# Patient Record
Sex: FEMALE | Race: WHITE | Employment: OTHER | URBAN - METROPOLITAN AREA
[De-identification: names, ages, dates, MRNs, and addresses within clinical notes are randomized per-mention and may not be internally consistent; named-entity substitution may affect disease eponyms.]

---

## 2017-06-29 ENCOUNTER — TRANSFERRED RECORDS (OUTPATIENT)
Dept: HEALTH INFORMATION MANAGEMENT | Facility: CLINIC | Age: 77
End: 2017-06-29

## 2017-10-27 ENCOUNTER — TRANSFERRED RECORDS (OUTPATIENT)
Dept: HEALTH INFORMATION MANAGEMENT | Facility: CLINIC | Age: 77
End: 2017-10-27

## 2021-02-05 ENCOUNTER — TRANSFERRED RECORDS (OUTPATIENT)
Dept: HEALTH INFORMATION MANAGEMENT | Facility: CLINIC | Age: 81
End: 2021-02-05

## 2021-05-26 ENCOUNTER — TRANSFERRED RECORDS (OUTPATIENT)
Dept: HEALTH INFORMATION MANAGEMENT | Facility: CLINIC | Age: 81
End: 2021-05-26

## 2021-05-26 ENCOUNTER — RECORDS - HEALTHEAST (OUTPATIENT)
Dept: LAB | Facility: CLINIC | Age: 81
End: 2021-05-26

## 2021-05-26 ENCOUNTER — MEDICAL CORRESPONDENCE (OUTPATIENT)
Dept: HEALTH INFORMATION MANAGEMENT | Facility: CLINIC | Age: 81
End: 2021-05-26

## 2021-05-27 ENCOUNTER — MEDICAL CORRESPONDENCE (OUTPATIENT)
Dept: HEALTH INFORMATION MANAGEMENT | Facility: CLINIC | Age: 81
End: 2021-05-27

## 2021-05-27 LAB
ALBUMIN SERPL-MCNC: 3 G/DL (ref 3.5–5)
ALP SERPL-CCNC: 65 U/L (ref 45–120)
ALT SERPL W P-5'-P-CCNC: <9 U/L (ref 0–45)
ANION GAP SERPL CALCULATED.3IONS-SCNC: 7 MMOL/L (ref 5–18)
AST SERPL W P-5'-P-CCNC: 11 U/L (ref 0–40)
BASOPHILS # BLD AUTO: 0.1 THOU/UL (ref 0–0.2)
BASOPHILS NFR BLD AUTO: 1 % (ref 0–2)
BILIRUB SERPL-MCNC: 0.2 MG/DL (ref 0–1)
BUN SERPL-MCNC: 36 MG/DL (ref 8–28)
CALCIUM SERPL-MCNC: 8.5 MG/DL (ref 8.5–10.5)
CHLORIDE BLD-SCNC: 105 MMOL/L (ref 98–107)
CHOLEST SERPL-MCNC: 151 MG/DL
CO2 SERPL-SCNC: 28 MMOL/L (ref 22–31)
CREAT SERPL-MCNC: 1.27 MG/DL (ref 0.6–1.1)
EOSINOPHIL # BLD AUTO: 0.5 THOU/UL (ref 0–0.4)
EOSINOPHIL NFR BLD AUTO: 7 % (ref 0–6)
ERYTHROCYTE [DISTWIDTH] IN BLOOD BY AUTOMATED COUNT: 14.7 % (ref 11–14.5)
FASTING STATUS PATIENT QL REPORTED: ABNORMAL
GFR SERPL CREATININE-BSD FRML MDRD: 40 ML/MIN/1.73M2
GLUCOSE BLD-MCNC: 93 MG/DL (ref 70–125)
HBA1C MFR BLD: 6.4 %
HCT VFR BLD AUTO: 30.7 % (ref 35–47)
HDLC SERPL-MCNC: 38 MG/DL
HGB BLD-MCNC: 9.4 G/DL (ref 12–16)
IMM GRANULOCYTES # BLD: 0 THOU/UL
IMM GRANULOCYTES NFR BLD: 0 %
LDLC SERPL CALC-MCNC: 97 MG/DL
LYMPHOCYTES # BLD AUTO: 2.5 THOU/UL (ref 0.8–4.4)
LYMPHOCYTES NFR BLD AUTO: 38 % (ref 20–40)
MCH RBC QN AUTO: 30.8 PG (ref 27–34)
MCHC RBC AUTO-ENTMCNC: 30.6 G/DL (ref 32–36)
MCV RBC AUTO: 101 FL (ref 80–100)
MONOCYTES # BLD AUTO: 0.5 THOU/UL (ref 0–0.9)
MONOCYTES NFR BLD AUTO: 8 % (ref 2–10)
NEUTROPHILS # BLD AUTO: 3 THOU/UL (ref 2–7.7)
NEUTROPHILS NFR BLD AUTO: 46 % (ref 50–70)
PLATELET # BLD AUTO: 191 THOU/UL (ref 140–440)
PMV BLD AUTO: 10.8 FL (ref 8.5–12.5)
POTASSIUM BLD-SCNC: 4.9 MMOL/L (ref 3.5–5)
PROT SERPL-MCNC: 6.2 G/DL (ref 6–8)
RBC # BLD AUTO: 3.05 MILL/UL (ref 3.8–5.4)
SODIUM SERPL-SCNC: 140 MMOL/L (ref 136–145)
TRIGL SERPL-MCNC: 82 MG/DL
TSH SERPL DL<=0.005 MIU/L-ACNC: 3.13 UIU/ML (ref 0.3–5)
VIT B12 SERPL-MCNC: 587 PG/ML (ref 213–816)
WBC: 6.6 THOU/UL (ref 4–11)

## 2021-05-28 LAB — 25(OH)D3 SERPL-MCNC: 34.1 NG/ML (ref 30–80)

## 2021-06-24 LAB
ALT SERPL-CCNC: 6 IU/L (ref 8–45)
AST SERPL-CCNC: 9 IU/L (ref 2–40)

## 2021-06-25 LAB — GLUCOSE (EXTERNAL): 139 MG/DL (ref 65–100)

## 2021-06-28 ENCOUNTER — TRANSFERRED RECORDS (OUTPATIENT)
Dept: MULTI SPECIALTY CLINIC | Facility: CLINIC | Age: 81
End: 2021-06-28

## 2021-06-28 LAB
CREATININE (EXTERNAL): 1.01 MG/DL (ref 0.57–1.11)
GFR ESTIMATED (EXTERNAL): 52 ML/MIN/1.73M2
GFR ESTIMATED (IF AFRICAN AMERICAN) (EXTERNAL): >60 ML/MIN/1.73M2
POTASSIUM (EXTERNAL): 4.9 MMOL/L (ref 3.5–5)

## 2021-07-07 ENCOUNTER — DOCUMENTATION ONLY (OUTPATIENT)
Dept: OTHER | Facility: CLINIC | Age: 81
End: 2021-07-07

## 2021-07-14 ENCOUNTER — APPOINTMENT (OUTPATIENT)
Dept: CT IMAGING | Facility: CLINIC | Age: 81
End: 2021-07-14
Attending: EMERGENCY MEDICINE
Payer: MEDICARE

## 2021-07-14 ENCOUNTER — HOSPITAL ENCOUNTER (EMERGENCY)
Facility: CLINIC | Age: 81
Discharge: HOME | End: 2021-07-14
Attending: EMERGENCY MEDICINE | Admitting: EMERGENCY MEDICINE
Payer: MEDICARE

## 2021-07-14 VITALS
WEIGHT: 230 LBS | OXYGEN SATURATION: 94 % | DIASTOLIC BLOOD PRESSURE: 60 MMHG | HEART RATE: 89 BPM | BODY MASS INDEX: 34.86 KG/M2 | HEIGHT: 68 IN | RESPIRATION RATE: 18 BRPM | TEMPERATURE: 98.5 F | SYSTOLIC BLOOD PRESSURE: 135 MMHG

## 2021-07-14 DIAGNOSIS — F11.220 OPIOID DEPENDENCE WITH UNCOMPLICATED INTOXICATION (H): ICD-10-CM

## 2021-07-14 DIAGNOSIS — R53.1 GENERALIZED WEAKNESS: ICD-10-CM

## 2021-07-14 DIAGNOSIS — G89.4 CHRONIC PAIN SYNDROME: ICD-10-CM

## 2021-07-14 DIAGNOSIS — F11.10 OPIATE ABUSE, EPISODIC (H): ICD-10-CM

## 2021-07-14 LAB
ALBUMIN SERPL-MCNC: 3.1 G/DL (ref 3.4–5)
ALBUMIN UR-MCNC: 100 MG/DL
ALP SERPL-CCNC: 91 U/L (ref 40–150)
ALT SERPL W P-5'-P-CCNC: 10 U/L (ref 0–50)
ANION GAP SERPL CALCULATED.3IONS-SCNC: 1 MMOL/L (ref 3–14)
APPEARANCE UR: ABNORMAL
AST SERPL W P-5'-P-CCNC: 10 U/L (ref 0–45)
BACTERIA #/AREA URNS HPF: ABNORMAL /HPF
BASOPHILS # BLD AUTO: 0.1 10E3/UL (ref 0–0.2)
BASOPHILS NFR BLD AUTO: 1 %
BILIRUB SERPL-MCNC: 0.4 MG/DL (ref 0.2–1.3)
BILIRUB UR QL STRIP: NEGATIVE
BUN SERPL-MCNC: 44 MG/DL (ref 7–30)
CALCIUM SERPL-MCNC: 9 MG/DL (ref 8.5–10.1)
CHLORIDE BLD-SCNC: 103 MMOL/L (ref 94–109)
CO2 SERPL-SCNC: 31 MMOL/L (ref 20–32)
COLOR UR AUTO: YELLOW
CREAT SERPL-MCNC: 1.39 MG/DL (ref 0.52–1.04)
EOSINOPHIL # BLD AUTO: 0.5 10E3/UL (ref 0–0.7)
EOSINOPHIL NFR BLD AUTO: 5 %
ERYTHROCYTE [DISTWIDTH] IN BLOOD BY AUTOMATED COUNT: 13.5 % (ref 10–15)
GFR SERPL CREATININE-BSD FRML MDRD: 35 ML/MIN/1.73M2
GLUCOSE BLD-MCNC: 206 MG/DL (ref 70–99)
GLUCOSE UR STRIP-MCNC: NEGATIVE MG/DL
HCT VFR BLD AUTO: 31.5 % (ref 35–47)
HGB BLD-MCNC: 9.8 G/DL (ref 11.7–15.7)
HGB UR QL STRIP: ABNORMAL
HOLD SPECIMEN: NORMAL
HYALINE CASTS: 10 /LPF
IMM GRANULOCYTES # BLD: 0 10E3/UL
IMM GRANULOCYTES NFR BLD: 0 %
KETONES UR STRIP-MCNC: NEGATIVE MG/DL
LEUKOCYTE ESTERASE UR QL STRIP: ABNORMAL
LYMPHOCYTES # BLD AUTO: 1.7 10E3/UL (ref 0.8–5.3)
LYMPHOCYTES NFR BLD AUTO: 17 %
MCH RBC QN AUTO: 31 PG (ref 26.5–33)
MCHC RBC AUTO-ENTMCNC: 31.1 G/DL (ref 31.5–36.5)
MCV RBC AUTO: 100 FL (ref 78–100)
MONOCYTES # BLD AUTO: 0.5 10E3/UL (ref 0–1.3)
MONOCYTES NFR BLD AUTO: 5 %
MUCOUS THREADS #/AREA URNS LPF: PRESENT /LPF
NEUTROPHILS # BLD AUTO: 7.2 10E3/UL (ref 1.6–8.3)
NEUTROPHILS NFR BLD AUTO: 72 %
NITRATE UR QL: NEGATIVE
NRBC # BLD AUTO: 0 10E3/UL
NRBC BLD AUTO-RTO: 0 /100
PH UR STRIP: 5 [PH] (ref 5–7)
PLATELET # BLD AUTO: 197 10E3/UL (ref 150–450)
POTASSIUM BLD-SCNC: 5.4 MMOL/L (ref 3.4–5.3)
PROT SERPL-MCNC: 7 G/DL (ref 6.8–8.8)
RBC # BLD AUTO: 3.16 10E6/UL (ref 3.8–5.2)
RBC URINE: 37 /HPF
SODIUM SERPL-SCNC: 135 MMOL/L (ref 133–144)
SP GR UR STRIP: 1.02 (ref 1–1.03)
SQUAMOUS EPITHELIAL: 1 /HPF
TSH SERPL DL<=0.005 MIU/L-ACNC: 3.6 MU/L (ref 0.4–4)
UROBILINOGEN UR STRIP-MCNC: NORMAL MG/DL
WBC # BLD AUTO: 9.9 10E3/UL (ref 4–11)
WBC URINE: 10 /HPF
YEAST #/AREA URNS HPF: ABNORMAL /HPF

## 2021-07-14 PROCEDURE — 36415 COLL VENOUS BLD VENIPUNCTURE: CPT | Performed by: EMERGENCY MEDICINE

## 2021-07-14 PROCEDURE — 93005 ELECTROCARDIOGRAM TRACING: CPT | Performed by: EMERGENCY MEDICINE

## 2021-07-14 PROCEDURE — 99285 EMERGENCY DEPT VISIT HI MDM: CPT | Mod: 25 | Performed by: EMERGENCY MEDICINE

## 2021-07-14 PROCEDURE — 81001 URINALYSIS AUTO W/SCOPE: CPT | Performed by: FAMILY MEDICINE

## 2021-07-14 PROCEDURE — 36592 COLLECT BLOOD FROM PICC: CPT | Performed by: EMERGENCY MEDICINE

## 2021-07-14 PROCEDURE — 93010 ELECTROCARDIOGRAM REPORT: CPT | Performed by: EMERGENCY MEDICINE

## 2021-07-14 PROCEDURE — 84443 ASSAY THYROID STIM HORMONE: CPT | Performed by: EMERGENCY MEDICINE

## 2021-07-14 PROCEDURE — 85025 COMPLETE CBC W/AUTO DIFF WBC: CPT | Performed by: EMERGENCY MEDICINE

## 2021-07-14 PROCEDURE — 82040 ASSAY OF SERUM ALBUMIN: CPT | Performed by: EMERGENCY MEDICINE

## 2021-07-14 PROCEDURE — 70450 CT HEAD/BRAIN W/O DYE: CPT

## 2021-07-14 RX ORDER — LOSARTAN POTASSIUM 50 MG/1
50 TABLET ORAL DAILY
COMMUNITY

## 2021-07-14 RX ORDER — GLIPIZIDE 5 MG/1
5 TABLET, FILM COATED, EXTENDED RELEASE ORAL DAILY
COMMUNITY

## 2021-07-14 RX ORDER — AMOXICILLIN 250 MG
1 CAPSULE ORAL DAILY
COMMUNITY

## 2021-07-14 RX ORDER — PHENOL 1.4 %
10 AEROSOL, SPRAY (ML) MUCOUS MEMBRANE
COMMUNITY

## 2021-07-14 RX ORDER — OXYCODONE HYDROCHLORIDE 5 MG/1
5 TABLET ORAL 3 TIMES DAILY
COMMUNITY

## 2021-07-14 RX ORDER — ALBUTEROL SULFATE 90 UG/1
2 AEROSOL, METERED RESPIRATORY (INHALATION) 2 TIMES DAILY PRN
COMMUNITY

## 2021-07-14 RX ORDER — ONDANSETRON 4 MG/1
4 TABLET, ORALLY DISINTEGRATING ORAL EVERY 6 HOURS PRN
COMMUNITY

## 2021-07-14 RX ORDER — DULOXETINE 40 MG/1
40 CAPSULE, DELAYED RELEASE ORAL 2 TIMES DAILY
COMMUNITY

## 2021-07-14 RX ORDER — GLIPIZIDE 5 MG/1
5 TABLET ORAL
COMMUNITY
End: 2022-03-01

## 2021-07-14 RX ORDER — ACETAMINOPHEN 500 MG
500 TABLET ORAL EVERY 6 HOURS PRN
COMMUNITY

## 2021-07-14 RX ORDER — CEFUROXIME AXETIL 500 MG/1
500 TABLET ORAL 2 TIMES DAILY
COMMUNITY
End: 2022-03-01

## 2021-07-14 RX ORDER — POLYETHYLENE GLYCOL 3350 17 G/17G
1 POWDER, FOR SOLUTION ORAL DAILY
COMMUNITY
End: 2022-03-01

## 2021-07-14 ASSESSMENT — MIFFLIN-ST. JEOR: SCORE: 1551.77
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
## 2021-08-13 ENCOUNTER — TELEPHONE (OUTPATIENT)
Dept: ORTHOPEDICS | Facility: CLINIC | Age: 81
End: 2021-08-13
Payer: COMMERCIAL

## 2021-08-13 ENCOUNTER — OFFICE VISIT (OUTPATIENT)
Dept: ORTHOPEDICS | Facility: CLINIC | Age: 81
End: 2021-08-13
Payer: COMMERCIAL

## 2021-08-13 ENCOUNTER — MEDICAL CORRESPONDENCE (OUTPATIENT)
Dept: HEALTH INFORMATION MANAGEMENT | Facility: CLINIC | Age: 81
End: 2021-08-13

## 2021-08-13 VITALS
DIASTOLIC BLOOD PRESSURE: 79 MMHG | SYSTOLIC BLOOD PRESSURE: 151 MMHG | BODY MASS INDEX: 34.97 KG/M2 | WEIGHT: 230 LBS | HEART RATE: 82 BPM

## 2021-08-13 DIAGNOSIS — M17.12 PRIMARY OSTEOARTHRITIS OF LEFT KNEE: Primary | ICD-10-CM

## 2021-08-13 DIAGNOSIS — G89.29 CHRONIC PAIN OF LEFT KNEE: ICD-10-CM

## 2021-08-13 DIAGNOSIS — M25.562 CHRONIC PAIN OF LEFT KNEE: ICD-10-CM

## 2021-08-13 DIAGNOSIS — M17.12 OSTEOARTHRITIS OF LEFT KNEE, UNSPECIFIED OSTEOARTHRITIS TYPE: Primary | ICD-10-CM

## 2021-08-13 PROCEDURE — 20611 DRAIN/INJ JOINT/BURSA W/US: CPT | Mod: LT | Performed by: FAMILY MEDICINE

## 2021-08-13 PROCEDURE — 99203 OFFICE O/P NEW LOW 30 MIN: CPT | Mod: 25 | Performed by: FAMILY MEDICINE

## 2021-08-13 RX ADMIN — TRIAMCINOLONE ACETONIDE 40 MG: 40 INJECTION, SUSPENSION INTRA-ARTICULAR; INTRAMUSCULAR at 13:30

## 2021-08-13 RX ADMIN — ROPIVACAINE HYDROCHLORIDE 3 ML: 5 INJECTION, SOLUTION EPIDURAL; INFILTRATION; PERINEURAL at 13:30

## 2021-08-13 ASSESSMENT — PAIN SCALES - GENERAL: PAINLEVEL: EXTREME PAIN (8)

## 2021-08-13 NOTE — TELEPHONE ENCOUNTER
Patient scheduled for appointment on [facility] for discussion of viscosupplementation injection vs steroid injection of left knee.      Steroid  injection last completed Spring 2021.       Prior authorization referral for SynviscOne injection pended.     Please advise.

## 2021-08-13 NOTE — LETTER
2021         RE: Scarlet Awad  [address] [address] [id number]        Dear Colleague,    Thank you for referring your patient, Scarlet Awad, to the [facility] [place]. Please see a copy of my visit note below.    Scarlet Awad  :  1940  DOS: 2021  MRN: [id number]    Sports Medicine Clinic Visit    PCP: No Ref-Primary, Physician    Scarlet Awad is a 81 year old female who is seen as a self referral presenting with left knee pain.    Injury: Chronic left knee pain. Possible surgical candidate, per previous providers. Pain located over anterior knee radiating down into her left foot.  Additional Features:  Positive: swelling.  Symptoms are better with Nothing.  Symptoms are worse with: any weightbearing.  Other evaluation and/or treatments so far consists of: Ice and Other medications: Oxycodone, Voltaren, Biofreeze, Physical therapy, failed to get significant relief from last corticosteroid injection.  Corticosteroid injection has been helpful in the past when combined with aspiration.  Recent imaging completed: CT and XR of left knee 2021.  Prior History of related problems: No    Social History: Retired    Review of Systems  Musculoskeletal: as above  Remainder of review of systems is negative including constitutional, CV, pulmonary, GI, Skin and Neurologic except as noted in HPI or medical history.    No past medical history on file.  No past surgical history on file.  No family history on file.    Objective  BP (!) 151/79 (BP Location: Left arm, Patient Position: Sitting)   Pulse 82   Wt 104.3 kg (230 lb)   BMI 34.97 kg/m        General: healthy, alert and in no distress      HEENT: no scleral icterus or conjunctival erythema     Skin: no suspicious lesions or rash. No jaundice.     CV: regular rhythm by palpation, 2+ distal pulses, no pedal edema      Resp: normal respiratory effort without conversational dyspnea     Psych: normal mood and  affect      Gait: antalgic, appropriate coordination and balance     Neuro: normal light touch sensory exam of the extremities. Motor strength as noted below     Left Knee exam    ROM:        Flexion 100 degrees       Extension -4 degrees       Range of motion limited by pain in terminal flexion    Inspection:       no visible ecchymosis        effusion noted trace/small    Skin:       no visible deformities       well perfused       capillary refill brisk    Patellar Motion:        Normal patellar tracking noted through range of motion       Crepitus noted in the patellofemoral joint    Tender:        medial patellar border       lateral patellar border       medial joint line       lateral joint line    Non Tender:         remainder of knee area    Special Tests:        neg (-) varus at 0 deg and 30 deg       neg (-) valgus at 0 deg and 30 deg    Evaluation of ipsilateral kinetic chain       normal strength with hip extension and abduction      Radiology  CT KNEE LEFT WO    Narrative    INDICATION:   Knee pain.     COMPARISON:   Plain film same date.     TECHNIQUE:   Multi detector imaging left knee with axial, coronal and sagittal reformats.     FINDINGS:   Small to moderate bland appearing joint effusion in the suprapatellar recess. Severe osteoarthritis. Bone-on-bone narrowing medially. Chondrocalcinosis of menisci laterally. Tricompartmental osteophytes. Osteophytes of the proximal tibia fibular joint. Subtle concavity through the lateral plateau articular cortex looks chronic. No definitive acute fracture lucency or osteochondral fragment. Some beam hardening from the patient`s adjacent right total knee limits image quality through this region.     IMPRESSION:   Severe osteoarthritis. Chondrocalcinosis of the lateral meniscus. Likely chronic shallow concave compression subchondral bone plate injury of the lateral plateau. Small to moderate bland knee joint effusion.      XR KNEE 3 VIEWS  RIGHT    Narrative    HISTORY:   Right knee pain.     TECHNIQUE:   Three views of the right knee.     COMPARISON:   04/04/2007.     FINDINGS:   Right total knee replacement with patellar resurfacing procedure. The components appear appropriately seated and intact. There is no hardware loosening or acute periprosthetic fracture. The ossicle noted laterally on the sunrise view appears corticated and chronic.     IMPRESSION:   Intact right total knee arthroplasty.     XR KNEE 3 VIEWS LEFT    Narrative    HISTORY:   Left knee pain.     TECHNIQUE:   Three views of the left knee.     COMPARISON:   No prior.     FINDINGS:   Tricompartmental degenerative arthrosis of the left knee. There is severe medial joint space compartment narrowing with chronic remodeling of articular surfaces. There is a small area of focal deformity of the subchondral bone plate of the lateral tibial plateau which may reflect sequelae of a fracture though is of uncertain chronicity. A small suprapatellar joint effusion is present. There are vascular calcifications.     IMPRESSION:   1. Tricompartmental degenerative arthrosis of the left knee with severe medial joint space compartment narrowing with bone-on-bone deformity and chronic remodeling.   2. Small area of focal deformity of the subchondral bone plate of the lateral tibial plateau which may reflect sequelae of minimally depressed fracture. This finding is of uncertain chronicity. Has the patient sustained recent trauma?   3. Small suprapatellar joint effusion.     Large Joint Injection/Arthocentesis: L knee joint    Date/Time: 8/13/2021 1:30 PM  Performed by: Juan Cason DO  Authorized by: Juan Cason DO     Indications:  Pain, osteoarthritis and joint swelling  Needle Size:  21 G  Guidance: ultrasound    Approach:  Superolateral  Location:  Knee      Medications:  40 mg triamcinolone 40 MG/ML; 3 mL ropivacaine 5 MG/ML  Outcome:  Tolerated well, no immediate  complications  Procedure discussed: discussed risks, benefits, and alternatives    Consent Given by:  Patient  Timeout: timeout called immediately prior to procedure    Prep: patient was prepped and draped in usual sterile fashion        Assessment:  1. Osteoarthritis of left knee, unspecified osteoarthritis type    2. Chronic pain of left knee        Plan:  Discussed the assessment with the patient.  Follow up: prn based on clinical progress  Severe flare of pain, not interested in TKA discussion at this point  XR and CT images and reports independently visualized and reviewed with patient today in clinic  End-stage OA present  Has worked with PT on this issue, good relief from CSI + aspiration in the past, CSI last time not helpful  US guided CSI today for diagnostic and therapeutic value  PRior authorization also submitted for visco trial, especially as she is hoping to avoid TKA  Pain medication strategies will continue to be managed according to her protocol from pain mgmt team  Low impact activity strategies and options, PT options, safe OTC topical pain reliever options reviewed  Expectations and goals of CSI reviewed  Often 2-3 days for steroid effect, and can take up to two weeks for maximum effect  We discussed modified progressive pain-free activity as tolerated  Do not overuse in first two weeks if feeling better due to concern for vulnerability while steroid is working  Supportive care reviewed  All questions were answered today  Contact us with additional questions or concerns  Signs and sx of concern reviewed      Juan Cason DO, BUSTER  Sports Medicine Physician  [facility]              Disclaimer: This note consists of symbols derived from keyboarding, dictation and/or voice recognition software. As a result, there may be errors in the script that have gone undetected. Please consider this when interpreting information found in this chart.        Again, thank you  for allowing me to participate in the care of your patient.        Sincerely,        Juan Cason, DO

## 2021-08-13 NOTE — PROGRESS NOTES
Scarlet Awad  :  1940  DOS: 2021  MRN: [id number]    Sports Medicine Clinic Visit    PCP: No Ref-Primary, Physician    Scarlet Awad is a 81 year old female who is seen as a self referral presenting with left knee pain.    Injury: Chronic left knee pain. Possible surgical candidate, per previous providers. Pain located over anterior knee radiating down into her left foot.  Additional Features:  Positive: swelling.  Symptoms are better with Nothing.  Symptoms are worse with: any weightbearing.  Other evaluation and/or treatments so far consists of: Ice and Other medications: Oxycodone, Voltaren, Biofreeze, Physical therapy, failed to get significant relief from last corticosteroid injection.  Corticosteroid injection has been helpful in the past when combined with aspiration.  Recent imaging completed: CT and XR of left knee 2021.  Prior History of related problems: No    Social History: Retired    Review of Systems  Musculoskeletal: as above  Remainder of review of systems is negative including constitutional, CV, pulmonary, GI, Skin and Neurologic except as noted in HPI or medical history.    No past medical history on file.  No past surgical history on file.  No family history on file.    Objective  BP (!) 151/79 (BP Location: Left arm, Patient Position: Sitting)   Pulse 82   Wt 104.3 kg (230 lb)   BMI 34.97 kg/m        General: healthy, alert and in no distress      HEENT: no scleral icterus or conjunctival erythema     Skin: no suspicious lesions or rash. No jaundice.     CV: regular rhythm by palpation, 2+ distal pulses, no pedal edema      Resp: normal respiratory effort without conversational dyspnea     Psych: normal mood and affect      Gait: antalgic, appropriate coordination and balance     Neuro: normal light touch sensory exam of the extremities. Motor strength as noted below     Left Knee exam    ROM:        Flexion 100 degrees       Extension -4 degrees       Range of motion  limited by pain in terminal flexion    Inspection:       no visible ecchymosis        effusion noted trace/small    Skin:       no visible deformities       well perfused       capillary refill brisk    Patellar Motion:        Normal patellar tracking noted through range of motion       Crepitus noted in the patellofemoral joint    Tender:        medial patellar border       lateral patellar border       medial joint line       lateral joint line    Non Tender:         remainder of knee area    Special Tests:        neg (-) varus at 0 deg and 30 deg       neg (-) valgus at 0 deg and 30 deg    Evaluation of ipsilateral kinetic chain       normal strength with hip extension and abduction      Radiology  CT KNEE LEFT WO    Narrative    INDICATION:   Knee pain.     COMPARISON:   Plain film same date.     TECHNIQUE:   Multi detector imaging left knee with axial, coronal and sagittal reformats.     FINDINGS:   Small to moderate bland appearing joint effusion in the suprapatellar recess. Severe osteoarthritis. Bone-on-bone narrowing medially. Chondrocalcinosis of menisci laterally. Tricompartmental osteophytes. Osteophytes of the proximal tibia fibular joint. Subtle concavity through the lateral plateau articular cortex looks chronic. No definitive acute fracture lucency or osteochondral fragment. Some beam hardening from the patient`s adjacent right total knee limits image quality through this region.     IMPRESSION:   Severe osteoarthritis. Chondrocalcinosis of the lateral meniscus. Likely chronic shallow concave compression subchondral bone plate injury of the lateral plateau. Small to moderate bland knee joint effusion.      XR KNEE 3 VIEWS RIGHT    Narrative    HISTORY:   Right knee pain.     TECHNIQUE:   Three views of the right knee.     COMPARISON:   04/04/2007.     FINDINGS:   Right total knee replacement with patellar resurfacing procedure. The components appear appropriately seated and intact. There is no  hardware loosening or acute periprosthetic fracture. The ossicle noted laterally on the sunrise view appears corticated and chronic.     IMPRESSION:   Intact right total knee arthroplasty.     XR KNEE 3 VIEWS LEFT    Narrative    HISTORY:   Left knee pain.     TECHNIQUE:   Three views of the left knee.     COMPARISON:   No prior.     FINDINGS:   Tricompartmental degenerative arthrosis of the left knee. There is severe medial joint space compartment narrowing with chronic remodeling of articular surfaces. There is a small area of focal deformity of the subchondral bone plate of the lateral tibial plateau which may reflect sequelae of a fracture though is of uncertain chronicity. A small suprapatellar joint effusion is present. There are vascular calcifications.     IMPRESSION:   1. Tricompartmental degenerative arthrosis of the left knee with severe medial joint space compartment narrowing with bone-on-bone deformity and chronic remodeling.   2. Small area of focal deformity of the subchondral bone plate of the lateral tibial plateau which may reflect sequelae of minimally depressed fracture. This finding is of uncertain chronicity. Has the patient sustained recent trauma?   3. Small suprapatellar joint effusion.     Large Joint Injection/Arthocentesis: L knee joint    Date/Time: 8/13/2021 1:30 PM  Performed by: Juan Cason DO  Authorized by: Juan Cason DO     Indications:  Pain, osteoarthritis and joint swelling  Needle Size:  21 G  Guidance: ultrasound    Approach:  Superolateral  Location:  Knee      Medications:  40 mg triamcinolone 40 MG/ML; 3 mL ropivacaine 5 MG/ML  Outcome:  Tolerated well, no immediate complications  Procedure discussed: discussed risks, benefits, and alternatives    Consent Given by:  Patient  Timeout: timeout called immediately prior to procedure    Prep: patient was prepped and draped in usual sterile fashion        Assessment:  1. Osteoarthritis of left knee,  unspecified osteoarthritis type    2. Chronic pain of left knee        Plan:  Discussed the assessment with the patient.  Follow up: prn based on clinical progress  Severe flare of pain, not interested in TKA discussion at this point  XR and CT images and reports independently visualized and reviewed with patient today in clinic  End-stage OA present  Has worked with PT on this issue, good relief from CSI + aspiration in the past, CSI last time not helpful  US guided CSI today for diagnostic and therapeutic value  PRior authorization also submitted for visco trial, especially as she is hoping to avoid TKA  Pain medication strategies will continue to be managed according to her protocol from pain mgmt team  Low impact activity strategies and options, PT options, safe OTC topical pain reliever options reviewed  Expectations and goals of CSI reviewed  Often 2-3 days for steroid effect, and can take up to two weeks for maximum effect  We discussed modified progressive pain-free activity as tolerated  Do not overuse in first two weeks if feeling better due to concern for vulnerability while steroid is working  Supportive care reviewed  All questions were answered today  Contact us with additional questions or concerns  Signs and sx of concern reviewed      Juan Cason DO, BUSTER  Sports Medicine Physician  [facility]              Disclaimer: This note consists of symbols derived from keyboarding, dictation and/or voice recognition software. As a result, there may be errors in the script that have gone undetected. Please consider this when interpreting information found in this chart.

## 2021-08-16 RX ORDER — ROPIVACAINE HYDROCHLORIDE 5 MG/ML
3 INJECTION, SOLUTION EPIDURAL; INFILTRATION; PERINEURAL
Status: SHIPPED | OUTPATIENT
Start: 2021-08-13

## 2021-08-16 RX ORDER — TRIAMCINOLONE ACETONIDE 40 MG/ML
40 INJECTION, SUSPENSION INTRA-ARTICULAR; INTRAMUSCULAR
Status: SHIPPED | OUTPATIENT
Start: 2021-08-13

## 2021-08-25 ENCOUNTER — LAB REQUISITION (OUTPATIENT)
Dept: LAB | Facility: CLINIC | Age: 81
End: 2021-08-25
Payer: COMMERCIAL

## 2021-08-25 DIAGNOSIS — E11.9 TYPE 2 DIABETES MELLITUS WITHOUT COMPLICATIONS (H): ICD-10-CM

## 2021-08-25 DIAGNOSIS — A41.9 SEPSIS, UNSPECIFIED ORGANISM (H): ICD-10-CM

## 2021-08-26 LAB
ALBUMIN SERPL-MCNC: 3.4 G/DL (ref 3.5–5)
ALP SERPL-CCNC: 98 U/L (ref 45–120)
ALT SERPL W P-5'-P-CCNC: 12 U/L (ref 0–45)
ANION GAP SERPL CALCULATED.3IONS-SCNC: 10 MMOL/L (ref 5–18)
AST SERPL W P-5'-P-CCNC: 14 U/L (ref 0–40)
BASOPHILS # BLD AUTO: 0.1 10E3/UL (ref 0–0.2)
BASOPHILS NFR BLD AUTO: 1 %
BILIRUB SERPL-MCNC: 0.2 MG/DL (ref 0–1)
BUN SERPL-MCNC: 31 MG/DL (ref 8–28)
CALCIUM SERPL-MCNC: 9.9 MG/DL (ref 8.5–10.5)
CHLORIDE BLD-SCNC: 101 MMOL/L (ref 98–107)
CO2 SERPL-SCNC: 29 MMOL/L (ref 22–31)
CREAT SERPL-MCNC: 1.31 MG/DL (ref 0.6–1.1)
EOSINOPHIL # BLD AUTO: 0.6 10E3/UL (ref 0–0.7)
EOSINOPHIL NFR BLD AUTO: 6 %
ERYTHROCYTE [DISTWIDTH] IN BLOOD BY AUTOMATED COUNT: 14.1 % (ref 10–15)
GFR SERPL CREATININE-BSD FRML MDRD: 38 ML/MIN/1.73M2
GLUCOSE BLD-MCNC: 193 MG/DL (ref 70–125)
HCT VFR BLD AUTO: 36.2 % (ref 35–47)
HGB BLD-MCNC: 11.1 G/DL (ref 11.7–15.7)
IMM GRANULOCYTES # BLD: 0 10E3/UL
IMM GRANULOCYTES NFR BLD: 0 %
LYMPHOCYTES # BLD AUTO: 2 10E3/UL (ref 0.8–5.3)
LYMPHOCYTES NFR BLD AUTO: 22 %
MCH RBC QN AUTO: 30.5 PG (ref 26.5–33)
MCHC RBC AUTO-ENTMCNC: 30.7 G/DL (ref 31.5–36.5)
MCV RBC AUTO: 100 FL (ref 78–100)
MONOCYTES # BLD AUTO: 0.5 10E3/UL (ref 0–1.3)
MONOCYTES NFR BLD AUTO: 6 %
NEUTROPHILS # BLD AUTO: 6 10E3/UL (ref 1.6–8.3)
NEUTROPHILS NFR BLD AUTO: 65 %
NRBC # BLD AUTO: 0 10E3/UL
NRBC BLD AUTO-RTO: 0 /100
PLATELET # BLD AUTO: 189 10E3/UL (ref 150–450)
POTASSIUM BLD-SCNC: 4.8 MMOL/L (ref 3.5–5)
PROT SERPL-MCNC: 7.4 G/DL (ref 6–8)
RBC # BLD AUTO: 3.64 10E6/UL (ref 3.8–5.2)
SODIUM SERPL-SCNC: 140 MMOL/L (ref 136–145)
WBC # BLD AUTO: 9.2 10E3/UL (ref 4–11)

## 2021-08-26 PROCEDURE — 36415 COLL VENOUS BLD VENIPUNCTURE: CPT | Mod: ORL | Performed by: FAMILY MEDICINE

## 2021-08-26 PROCEDURE — 80053 COMPREHEN METABOLIC PANEL: CPT | Mod: ORL | Performed by: FAMILY MEDICINE

## 2021-08-26 PROCEDURE — 85025 COMPLETE CBC W/AUTO DIFF WBC: CPT | Mod: ORL | Performed by: FAMILY MEDICINE

## 2021-08-26 PROCEDURE — 83036 HEMOGLOBIN GLYCOSYLATED A1C: CPT | Mod: ORL | Performed by: FAMILY MEDICINE

## 2021-08-26 PROCEDURE — P9603 ONE-WAY ALLOW PRORATED MILES: HCPCS | Mod: ORL | Performed by: FAMILY MEDICINE

## 2021-08-27 LAB — HBA1C MFR BLD: 6.7 %

## 2021-09-25 ENCOUNTER — HEALTH MAINTENANCE LETTER (OUTPATIENT)
Age: 81
End: 2021-09-25

## 2022-01-15 ENCOUNTER — HEALTH MAINTENANCE LETTER (OUTPATIENT)
Age: 82
End: 2022-01-15

## 2022-02-22 NOTE — PROGRESS NOTES
Outcome for 02/22/22 4:09 PM: Anke message sent   Zandra Pittman on 2/22/2022 at 4:09 PM  Outcome for 02/28/22 9:11 AM: Left Voicemail   Zandra Pittman on 2/28/2022 at 9:11 AM  Outcome for 02/28/22 1:52 PM: Left Voicemail    Zandra Pittman on 2/28/2022 at 1:52 PM    Scarlet Awad  is being evaluated via a billable video visit.      How would you like to obtain your AVS? [employer]  For the video visit, send the invitation by: Text to cell phone: [phone]  Will anyone else be joining your video visit? No

## 2022-03-01 ENCOUNTER — MYC MEDICAL ADVICE (OUTPATIENT)
Dept: ENDOCRINOLOGY | Facility: CLINIC | Age: 82
End: 2022-03-01

## 2022-03-01 ENCOUNTER — VIRTUAL VISIT (OUTPATIENT)
Dept: ENDOCRINOLOGY | Facility: CLINIC | Age: 82
End: 2022-03-01
Payer: COMMERCIAL

## 2022-03-01 DIAGNOSIS — N18.32 CHRONIC KIDNEY DISEASE, STAGE 3B (H): ICD-10-CM

## 2022-03-01 DIAGNOSIS — E11.69 TYPE 2 DIABETES MELLITUS WITH OTHER SPECIFIED COMPLICATION, WITH LONG-TERM CURRENT USE OF INSULIN (H): Primary | ICD-10-CM

## 2022-03-01 DIAGNOSIS — Z79.4 TYPE 2 DIABETES MELLITUS WITH OTHER SPECIFIED COMPLICATION, WITH LONG-TERM CURRENT USE OF INSULIN (H): Primary | ICD-10-CM

## 2022-03-01 PROBLEM — E11.9 DIABETES MELLITUS, TYPE 2 (H): Status: ACTIVE | Noted: 2022-03-01

## 2022-03-01 PROCEDURE — 99205 OFFICE O/P NEW HI 60 MIN: CPT | Mod: 95 | Performed by: INTERNAL MEDICINE

## 2022-03-01 RX ORDER — LATANOPROST 50 UG/ML
1 SOLUTION/ DROPS OPHTHALMIC AT BEDTIME
COMMUNITY
Start: 2022-01-17

## 2022-03-01 RX ORDER — GABAPENTIN 100 MG/1
200 CAPSULE ORAL 3 TIMES DAILY PRN
COMMUNITY
Start: 2021-10-26

## 2022-03-01 RX ORDER — FLASH GLUCOSE SCANNING READER
EACH MISCELLANEOUS
Qty: 1 EACH | Refills: 0 | Status: SHIPPED | OUTPATIENT
Start: 2022-03-01

## 2022-03-01 RX ORDER — HYDRALAZINE HYDROCHLORIDE 10 MG/1
TABLET, FILM COATED ORAL
COMMUNITY
Start: 2022-01-17

## 2022-03-01 RX ORDER — ISOSORBIDE DINITRATE 10 MG/1
1 TABLET ORAL
COMMUNITY
Start: 2022-02-11

## 2022-03-01 RX ORDER — MELATONIN 10 MG
1 CAPSULE ORAL AT BEDTIME
COMMUNITY
Start: 2022-01-14

## 2022-03-01 RX ORDER — BUPRENORPHINE 10 UG/H
1 PATCH TRANSDERMAL
COMMUNITY
Start: 2022-02-10

## 2022-03-01 RX ORDER — FLASH GLUCOSE SENSOR
KIT MISCELLANEOUS
Qty: 2 EACH | Refills: 11 | Status: SHIPPED | OUTPATIENT
Start: 2022-03-01

## 2022-03-01 RX ORDER — NALOXONE HYDROCHLORIDE 0.4 MG/ML
INJECTION, SOLUTION INTRAMUSCULAR; INTRAVENOUS; SUBCUTANEOUS PRN
COMMUNITY
Start: 2022-01-20

## 2022-03-01 RX ORDER — NITROFURANTOIN 25; 75 MG/1; MG/1
1 CAPSULE ORAL EVERY 12 HOURS
COMMUNITY

## 2022-03-01 RX ORDER — LIDOCAINE 50 MG/G
PATCH TOPICAL
COMMUNITY
Start: 2022-01-06

## 2022-03-01 ASSESSMENT — ENCOUNTER SYMPTOMS
POSTURAL DYSPNEA: 0
DYSURIA: 0
DISTURBANCES IN COORDINATION: 1
BLOATING: 0
JOINT SWELLING: 1
HEADACHES: 0
COUGH DISTURBING SLEEP: 0
BACK PAIN: 1
DYSPNEA ON EXERTION: 0
MUSCLE WEAKNESS: 1
CONSTIPATION: 1
SHORTNESS OF BREATH: 1
SINUS CONGESTION: 0
SINUS PAIN: 0
LOSS OF CONSCIOUSNESS: 0
SNORES LOUDLY: 0
SORE THROAT: 0
STIFFNESS: 1
ABDOMINAL PAIN: 0
WEAKNESS: 1
BOWEL INCONTINENCE: 0
SMELL DISTURBANCE: 0
VOMITING: 0
NUMBNESS: 0
HEMOPTYSIS: 0
JAUNDICE: 0
MUSCLE CRAMPS: 0
NAIL CHANGES: 0
RECTAL PAIN: 0
SPEECH CHANGE: 1
TREMORS: 1
TASTE DISTURBANCE: 0
COUGH: 1
HEARTBURN: 0
WHEEZING: 1
POOR WOUND HEALING: 1
NECK PAIN: 0
MYALGIAS: 1
ARTHRALGIAS: 1
HEMATURIA: 0
BLOOD IN STOOL: 0
TROUBLE SWALLOWING: 0
SEIZURES: 0
DIZZINESS: 1
MEMORY LOSS: 1
FLANK PAIN: 0
TINGLING: 0
HOARSE VOICE: 0
NAUSEA: 0
SKIN CHANGES: 0
NECK MASS: 0
SPUTUM PRODUCTION: 1
DIFFICULTY URINATING: 0
DIARRHEA: 0
PARALYSIS: 0

## 2022-03-01 NOTE — LETTER
3/1/2022         RE: Scarlet Awad  [address] [address] [place] [address]        Dear Colleague,    Thank you for referring your patient, Scarlet Awad, to the [facility]. Please see a copy of my visit note below.    Outcome for 02/22/22 4:09 PM: Ecelles Carson message sent   Zandra Pittman on 2/22/2022 at 4:09 PM  Outcome for 02/28/22 9:11 AM: Left Voicemail   Zandra Pittman on 2/28/2022 at 9:11 AM  Outcome for 02/28/22 1:52 PM: Left Voicemail    Zandra Pittman on 2/28/2022 at 1:52 PM    Scarlet Awad  is being evaluated via a billable video visit.      How would you like to obtain your AVS? [employer]  For the video visit, send the invitation by: Text to cell phone: [phone]  Will anyone else be joining your video visit? No                        Endocrinology Clinic Visit    Chief Complaint: Video Visit (Type 2 Diabetes)     Information obtained from:Patient and daughter - Kathy       Assessment/Treatment Plan:      Type 2 diabetes currently on long-term insulin therapy complicated by chronic kidney disease stage IIIb  Blood glucose readings reviewed.  Readings over the last few days have been within the target range on the current medication glipizide extended release 5 mg daily and Lantus 12 units daily.  This combination has a risk of hypoglycemia.  No low blood sugar readings below 88 over the last 4 weeks.  For now we will continue the same combination however long-term will consider other antidiabetic medications with the low hypoglycemia risk.  Interested in pursuing continuous glucose monitor and prescription provided and will review BG readings from continuous glucose monitor in a few weeks.   Plan:  #1 continue glipizide extended release 5 mg daily.  2.  Continue insulin glargine or Lantus 12 units at bedtime  #3 please check blood sugar readings at least 4 times per day using continuous glucose monitoring which was provided today.  We will review blood glucose  readings in a few weeks.  In the meantime, if you notice any low blood sugars below 70; please call the clinic at the number provided below.    Return to clinic in 1 month.    Test and/or medications prescribed today:  Orders Placed This Encounter   Procedures     Hemoglobin A1c         Khanh Franco MD  Staff Endocrinologist    Division of Endocrinology and Diabetes      Subjective:         HPI: Scarlet Awad is a 81 year old female with history of type 2 diabetes currently at assisted living who is here for follow up of the same.   Interested in CGM.   Diabetes dx Over 10 years.   antidiabetic medication:  Glipizide ER 5 mg daily.   lantus 12 units daily.    BG  202 5 pm  132 177  109 200  118 228  128 195  190  179 258  445   331  204   262  88  No low numbers 97 and 88 the lowest.        Allergies   Allergen Reactions     Ascorbate Other (See Comments)     Mouth sores     Latex Unknown     Pt states she is allergic to latex but does not remember what happens     Metformin Other (See Comments) and Unknown     High blood sugar.     Penicillin G      Adhesive Tape Rash     Aspirin Other (See Comments)     She does not digest the 325 ASA. Can take baby and chew it.      Fentanyl Rash     Adhesive rash       Current Outpatient Medications   Medication Sig Dispense Refill     acetaminophen (TYLENOL) 500 MG tablet Take 500 mg by mouth every 6 hours as needed for mild pain       albuterol (PROAIR HFA/PROVENTIL HFA/VENTOLIN HFA) 108 (90 Base) MCG/ACT inhaler Inhale 2 puffs into the lungs 2 times daily as needed for shortness of breath / dyspnea or wheezing       buprenorphine (BUTRANS) 10 MCG/HR WK patch Place 1 patch onto the skin every 7 days       Continuous Blood Gluc  (FREESTYLE JYOTI 14 DAY READER) MARLON Use to read blood sugars as per 's instructions. 1 each 0     Continuous Blood Gluc Sensor (FREESTYLE JYOTI 14 DAY SENSOR) MISC Change every 14 days. 2 each 11     DULoxetine HCl 40 MG  CPEP Take 40 mg by mouth 2 times daily        gabapentin (NEURONTIN) 100 MG capsule Take 200 mg by mouth 3 times daily as needed       glipiZIDE (GLUCOTROL XL) 5 MG 24 hr tablet Take 5 mg by mouth daily       hydrALAZINE (APRESOLINE) 10 MG tablet 1 tab by mouth bid as needed for SBP >180 or DBP >100       insulin glargine (LANTUS PEN) 100 UNIT/ML pen Inject 12 Units Subcutaneous At Bedtime        isosorbide dinitrate (ISORDIL) 10 MG tablet Take 1 tablet by mouth 3 times daily (before meals)       latanoprost (XALATAN) 0.005 % ophthalmic solution Place 1 drop into both eyes At Bedtime       lidocaine (LIDODERM) 5 % patch Apply patch to affected area for 12 hours, then remove for 12 hours.       losartan (COZAAR) 50 MG tablet Take 50 mg by mouth daily       magnesium hydroxide (MILK OF MAGNESIA) 400 MG/5ML suspension Take 30 mLs by mouth daily as needed for constipation or heartburn       Melatonin 10 MG CAPS Take 1 capsule by mouth At Bedtime       Melatonin 10 MG TABS tablet Take 10 mg by mouth nightly as needed for sleep       nitroFURantoin macrocrystal-monohydrate (MACROBID) 100 MG capsule Take 1 capsule by mouth every 12 hours       ondansetron (ZOFRAN-ODT) 4 MG ODT tab Take 4 mg by mouth every 6 hours as needed for nausea       oxyCODONE (ROXICODONE) 5 MG tablet Take 5 mg by mouth 3 times daily        rivaroxaban ANTICOAGULANT (XARELTO) 20 MG TABS tablet Take 20 mg by mouth daily before breakfast       senna-docusate (SENOKOT-S/PERICOLACE) 8.6-50 MG tablet Take 1 tablet by mouth daily       naloxone (NARCAN) 0.4 MG/ML injection as needed         Review of Systems   Pertinent  as per HPI above      Objective:   GENERAL: alert; lying down on bed.   PSYCH: Mentation appears normal, affect normal/bright.    In House Labs:   Lab Results   Component Value Date    A1C 6.7 08/26/2021    A1C 6.4 05/27/2021       TSH   Date Value Ref Range Status   07/14/2021 3.60 0.40 - 4.00 mU/L Final   05/27/2021 3.13 0.30 - 5.00  uIU/mL Final     GFR Estimate   Date Value Ref Range Status   08/26/2021 38 (L) >60 mL/min/1.73m2 Final     Comment:     As of July 11, 2021, eGFR is calculated by the CKD-EPI creatinine equation, without race adjustment. eGFR can be influenced by muscle mass, exercise, and diet. The reported eGFR is an estimation only and is only applicable if the renal function is stable.   05/27/2021 40 (L) >60 mL/min/1.73m2 Final     GFR Estimate If Black   Date Value Ref Range Status   05/27/2021 49 (L) >60 mL/min/1.73m2 Final       Creatinine   Date Value Ref Range Status   08/26/2021 1.31 (H) 0.60 - 1.10 mg/dL Final   ]    Recent Labs   Lab Test 05/27/21  0926   CHOL 151   HDL 38*   LDL 97   TRIG 82   This note has been dictated using voice recognition software.  As a result, there may be errors in the documentation that have gone undetected.  Please consider this when interpreting information in this documentation.        Video-Visit Details    Type of service:  Video Visit  All times are in your local time  1st VideoStart: 03/01/2022 08:04 am  Stop: 03/01/2022 08:40 am  Originating Location (pt. Location): Assisted Living  [facility]  65 minutes spent on the date of the encounter doing chart review, history and exam, documentation and further activities per the note.        Again, thank you for allowing me to participate in the care of your patient.        Sincerely,        Khanh Franco MD

## 2022-03-01 NOTE — PROGRESS NOTES
Endocrinology Clinic Visit    Chief Complaint: Video Visit (Type 2 Diabetes)     Information obtained from:Patient and daughter - Kathy       Assessment/Treatment Plan:      Type 2 diabetes currently on long-term insulin therapy complicated by chronic kidney disease stage IIIb  Blood glucose readings reviewed.  Readings over the last few days have been within the target range on the current medication glipizide extended release 5 mg daily and Lantus 12 units daily.  This combination has a risk of hypoglycemia.  No low blood sugar readings below 88 over the last 4 weeks.  For now we will continue the same combination however long-term will consider other antidiabetic medications with the low hypoglycemia risk.  Interested in pursuing continuous glucose monitor and prescription provided and will review BG readings from continuous glucose monitor in a few weeks.   Plan:  #1 continue glipizide extended release 5 mg daily.  2.  Continue insulin glargine or Lantus 12 units at bedtime  #3 please check blood sugar readings at least 4 times per day using continuous glucose monitoring which was provided today.  We will review blood glucose readings in a few weeks.  In the meantime, if you notice any low blood sugars below 70; please call the clinic at the number provided below.    Return to clinic in 1 month.    Test and/or medications prescribed today:  Orders Placed This Encounter   Procedures     Hemoglobin A1c         Khanh Franco MD  Staff Endocrinologist    Division of Endocrinology and Diabetes      Subjective:         HPI: Scarlet Awad is a 81 year old female with history of type 2 diabetes currently at assisted living who is here for follow up of the same.   Interested in CGM.   Diabetes dx Over 10 years.   antidiabetic medication:  Glipizide ER 5 mg daily.   lantus 12 units daily.    BG  202 5 pm  132 177  109 200  118 228  128 195  190  179 258  445   331  204   262  88  No low numbers 97 and 88 the  lowest.        Allergies   Allergen Reactions     Ascorbate Other (See Comments)     Mouth sores     Latex Unknown     Pt states she is allergic to latex but does not remember what happens     Metformin Other (See Comments) and Unknown     High blood sugar.     Penicillin G      Adhesive Tape Rash     Aspirin Other (See Comments)     She does not digest the 325 ASA. Can take baby and chew it.      Fentanyl Rash     Adhesive rash       Current Outpatient Medications   Medication Sig Dispense Refill     acetaminophen (TYLENOL) 500 MG tablet Take 500 mg by mouth every 6 hours as needed for mild pain       albuterol (PROAIR HFA/PROVENTIL HFA/VENTOLIN HFA) 108 (90 Base) MCG/ACT inhaler Inhale 2 puffs into the lungs 2 times daily as needed for shortness of breath / dyspnea or wheezing       buprenorphine (BUTRANS) 10 MCG/HR WK patch Place 1 patch onto the skin every 7 days       Continuous Blood Gluc  (FREESTYLE JYOTI 14 DAY READER) MARLON Use to read blood sugars as per 's instructions. 1 each 0     Continuous Blood Gluc Sensor (JustFabSTYLE JYOTI 14 DAY SENSOR) MISC Change every 14 days. 2 each 11     DULoxetine HCl 40 MG CPEP Take 40 mg by mouth 2 times daily        gabapentin (NEURONTIN) 100 MG capsule Take 200 mg by mouth 3 times daily as needed       glipiZIDE (GLUCOTROL XL) 5 MG 24 hr tablet Take 5 mg by mouth daily       hydrALAZINE (APRESOLINE) 10 MG tablet 1 tab by mouth bid as needed for SBP >180 or DBP >100       insulin glargine (LANTUS PEN) 100 UNIT/ML pen Inject 12 Units Subcutaneous At Bedtime        isosorbide dinitrate (ISORDIL) 10 MG tablet Take 1 tablet by mouth 3 times daily (before meals)       latanoprost (XALATAN) 0.005 % ophthalmic solution Place 1 drop into both eyes At Bedtime       lidocaine (LIDODERM) 5 % patch Apply patch to affected area for 12 hours, then remove for 12 hours.       losartan (COZAAR) 50 MG tablet Take 50 mg by mouth daily       magnesium hydroxide (MILK OF  MAGNESIA) 400 MG/5ML suspension Take 30 mLs by mouth daily as needed for constipation or heartburn       Melatonin 10 MG CAPS Take 1 capsule by mouth At Bedtime       Melatonin 10 MG TABS tablet Take 10 mg by mouth nightly as needed for sleep       nitroFURantoin macrocrystal-monohydrate (MACROBID) 100 MG capsule Take 1 capsule by mouth every 12 hours       ondansetron (ZOFRAN-ODT) 4 MG ODT tab Take 4 mg by mouth every 6 hours as needed for nausea       oxyCODONE (ROXICODONE) 5 MG tablet Take 5 mg by mouth 3 times daily        rivaroxaban ANTICOAGULANT (XARELTO) 20 MG TABS tablet Take 20 mg by mouth daily before breakfast       senna-docusate (SENOKOT-S/PERICOLACE) 8.6-50 MG tablet Take 1 tablet by mouth daily       naloxone (NARCAN) 0.4 MG/ML injection as needed         Review of Systems   Pertinent  as per HPI above      Objective:   GENERAL: alert; lying down on bed.   PSYCH: Mentation appears normal, affect normal/bright.    In House Labs:   Lab Results   Component Value Date    A1C 6.7 08/26/2021    A1C 6.4 05/27/2021       TSH   Date Value Ref Range Status   07/14/2021 3.60 0.40 - 4.00 mU/L Final   05/27/2021 3.13 0.30 - 5.00 uIU/mL Final     GFR Estimate   Date Value Ref Range Status   08/26/2021 38 (L) >60 mL/min/1.73m2 Final     Comment:     As of July 11, 2021, eGFR is calculated by the CKD-EPI creatinine equation, without race adjustment. eGFR can be influenced by muscle mass, exercise, and diet. The reported eGFR is an estimation only and is only applicable if the renal function is stable.   05/27/2021 40 (L) >60 mL/min/1.73m2 Final     GFR Estimate If Black   Date Value Ref Range Status   05/27/2021 49 (L) >60 mL/min/1.73m2 Final       Creatinine   Date Value Ref Range Status   08/26/2021 1.31 (H) 0.60 - 1.10 mg/dL Final   ]    Recent Labs   Lab Test 05/27/21  0926   CHOL 151   HDL 38*   LDL 97   TRIG 82   This note has been dictated using voice recognition software.  As a result, there may be errors in  the documentation that have gone undetected.  Please consider this when interpreting information in this documentation.        Video-Visit Details    Type of service:  Video Visit  All times are in your local time  1st VideoStart: 03/01/2022 08:04 am  Stop: 03/01/2022 08:40 am  Originating Location (pt. Location): Assisted Living  [facility]  65 minutes spent on the date of the encounter doing chart review, history and exam, documentation and further activities per the note.

## 2022-03-01 NOTE — PATIENT INSTRUCTIONS
Scarlet,     It was a pleasure meeting you.  We are continuing your antidiabetic medications for now without any change.    #1 continue glipizide extended release 5 mg daily.  2.  Continue insulin glargine or Lantus 12 units at bedtime  #3 please check blood sugar readings at least 4 times per day using continuous glucose monitoring which was provided today.  We will review blood glucose readings in a few weeks.  In the meantime, if you notice any low blood sugars below 70; please call the clinic at the number provided below.    [facility]-Department of Endocrinology  Anita Tariq RN, Diabetes Educator: [phone]  Clinic Nurses DANIEL Benitez; CMA's: Lorrie Henry and Heriberto [phone]  Clinic Fax: [phone]  On-Call Endocrine at the [facility] (after hours/weekends): [phone] option 4  Scheduling Line: [phone]   Please call the number below to schedule your labs.

## 2022-03-02 ENCOUNTER — TELEPHONE (OUTPATIENT)
Dept: ENDOCRINOLOGY | Facility: CLINIC | Age: 82
End: 2022-03-02
Payer: COMMERCIAL

## 2022-03-02 NOTE — TELEPHONE ENCOUNTER
3/2 1st attempt.  LVM for patient to schedule a 4 week follow up visit with Dr. Franco around 3/29/22.    Please assist patient in scheduling when they call back.    Thanks    Aysha Edwards  Pediatric Specialty /Adult Endocrinology  [facility]

## 2022-03-04 ENCOUNTER — TELEPHONE (OUTPATIENT)
Dept: ENDOCRINOLOGY | Facility: CLINIC | Age: 82
End: 2022-03-04
Payer: COMMERCIAL

## 2022-03-04 NOTE — TELEPHONE ENCOUNTER
Left message for patient to call back to schedule 1 month follow up appointment with Dr. Franco. MyChart message also sent as a reminder for patient to call for scheduling.    Gladys Blanton, Virtual Visit Facilitator

## 2022-03-06 ENCOUNTER — MYC MEDICAL ADVICE (OUTPATIENT)
Dept: ENDOCRINOLOGY | Facility: CLINIC | Age: 82
End: 2022-03-06
Payer: COMMERCIAL

## 2022-03-07 NOTE — TELEPHONE ENCOUNTER
Message left on daughter's mobile phone advising her the patient needs to come in for sensor training  before orders can be sent to long term care facility. Requested daughter call [phone], to schedule appt with diabetes educator (Anita Tariq) for Radha sensor teaching.     Anita Tariq, RN, BSN, [facility]   Certified Diabetes Care/  [facility]

## 2022-03-14 ENCOUNTER — MYC MEDICAL ADVICE (OUTPATIENT)
Dept: ENDOCRINOLOGY | Facility: CLINIC | Age: 82
End: 2022-03-14
Payer: COMMERCIAL

## 2022-03-14 NOTE — TELEPHONE ENCOUNTER
Called patient's daughter Kathy to inform them that provider is only in on Monday and Tuesdays not on Fridays. Kathy verbalized understanding and scheduled for 4/12/22 at 8:30 am for a video visit.    Routing to E pool as Kathy states that Insurance used to cover Radha and recently informed patient that Radha no longer covered. Supplies was sent recently and patient did not received all supplies that was supposed to be received. Patient would like to discuss this and see what might have happened.    Lorrie ESPINOZA MA   Adult Endocrine   [facility]

## 2022-03-14 NOTE — TELEPHONE ENCOUNTER
Writer left message for Scarlet to return call. Writer attempted Kathy's phone on file and was told by the person who picked up the phone that it is the wrong number. Please assist patient with any of the two times mentioned for return visit and update daughter's phone number:    1. 4/12/22 at 8:30am  2. 4/12/22 at 10:30am      Nicholas Lawson [facility]  Adult Endocrinology  [facility]

## 2022-03-15 ENCOUNTER — TELEPHONE (OUTPATIENT)
Dept: ENDOCRINOLOGY | Facility: CLINIC | Age: 82
End: 2022-03-15
Payer: COMMERCIAL

## 2022-03-15 NOTE — TELEPHONE ENCOUNTER
PA Initiation    Medication: Freestyle Radha 14 day PA Pending  Insurance Company: [employer] - Phone [phone] Fax [phone]  Pharmacy Filling the Rx:    Filling Pharmacy Phone:    Filling Pharmacy Fax:    Start Date: 3/15/2022

## 2022-03-15 NOTE — TELEPHONE ENCOUNTER
Per Epic, patient's daughter called back and scheduled an appointment for 4/12/22 at 8:30am.    Nicholas Lawson [facility]  Adult Endocrinology  [facility]

## 2022-03-22 NOTE — TELEPHONE ENCOUNTER
Records received. Form from group home completed and faxed by to Rachana at F#[phone].    Nicholas Lawson [facility]  Adult Endocrinology  [facility]

## 2022-03-24 NOTE — TELEPHONE ENCOUNTER
PRIOR AUTHORIZATION DENIED    Medication: Freestyle Radha 14 day PA Denied    Denial Date: 3/15/2022    Denial Rational:     Appeal Information:

## 2022-03-25 NOTE — TELEPHONE ENCOUNTER
Prior authorization for Radha continuous glucose monitor personal was denied.   The requirement is to be taking 3 insulin injections per day.   Attempted to reach Scarlet without success.  Phone number on file for daughter is incorrect.  Scarlet seeing Dr.Kidmealem Franco on April 12.  Lillie Crawley, RN, [facility]  Diabetes Educator and RN Care Coordinator  Adult Endocrinology  [facility]

## 2022-03-27 ENCOUNTER — LAB REQUISITION (OUTPATIENT)
Dept: LAB | Facility: CLINIC | Age: 82
End: 2022-03-27
Payer: COMMERCIAL

## 2022-03-27 DIAGNOSIS — M54.50 LOW BACK PAIN, UNSPECIFIED: ICD-10-CM

## 2022-03-28 ENCOUNTER — MYC MEDICAL ADVICE (OUTPATIENT)
Dept: ENDOCRINOLOGY | Facility: CLINIC | Age: 82
End: 2022-03-28
Payer: COMMERCIAL

## 2022-03-28 PROCEDURE — 86481 TB AG RESPONSE T-CELL SUSP: CPT | Mod: ORL | Performed by: INTERNAL MEDICINE

## 2022-03-28 PROCEDURE — P9603 ONE-WAY ALLOW PRORATED MILES: HCPCS | Mod: ORL | Performed by: INTERNAL MEDICINE

## 2022-03-28 PROCEDURE — 36415 COLL VENOUS BLD VENIPUNCTURE: CPT | Mod: ORL | Performed by: INTERNAL MEDICINE

## 2022-03-29 LAB
GAMMA INTERFERON BACKGROUND BLD IA-ACNC: 0.03 IU/ML
M TB IFN-G BLD-IMP: NEGATIVE
M TB IFN-G CD4+ BCKGRND COR BLD-ACNC: 2.21 IU/ML
MITOGEN IGNF BCKGRD COR BLD-ACNC: 0 IU/ML
MITOGEN IGNF BCKGRD COR BLD-ACNC: 0 IU/ML
QUANTIFERON MITOGEN: 2.24 IU/ML
QUANTIFERON NIL TUBE: 0.03 IU/ML
QUANTIFERON TB1 TUBE: 0.03 IU/ML
QUANTIFERON TB2 TUBE: 0.03

## 2022-05-07 ENCOUNTER — HEALTH MAINTENANCE LETTER (OUTPATIENT)
Age: 82
End: 2022-05-07

## 2022-05-31 ENCOUNTER — LAB REQUISITION (OUTPATIENT)
Dept: LAB | Facility: CLINIC | Age: 82
End: 2022-05-31
Payer: COMMERCIAL

## 2022-05-31 DIAGNOSIS — I15.2 HYPERTENSION SECONDARY TO ENDOCRINE DISORDERS: ICD-10-CM

## 2022-06-01 LAB
ANION GAP SERPL CALCULATED.3IONS-SCNC: 8 MMOL/L (ref 5–18)
BUN SERPL-MCNC: 24 MG/DL (ref 8–28)
CALCIUM SERPL-MCNC: 9.1 MG/DL (ref 8.5–10.5)
CHLORIDE BLD-SCNC: 102 MMOL/L (ref 98–107)
CO2 SERPL-SCNC: 32 MMOL/L (ref 22–31)
CREAT SERPL-MCNC: 0.86 MG/DL (ref 0.6–1.1)
GFR SERPL CREATININE-BSD FRML MDRD: 67 ML/MIN/1.73M2
GLUCOSE BLD-MCNC: 95 MG/DL (ref 70–125)
POTASSIUM BLD-SCNC: 4.1 MMOL/L (ref 3.5–5)
SODIUM SERPL-SCNC: 142 MMOL/L (ref 136–145)

## 2022-06-01 PROCEDURE — 36415 COLL VENOUS BLD VENIPUNCTURE: CPT | Mod: ORL | Performed by: INTERNAL MEDICINE

## 2022-06-01 PROCEDURE — P9603 ONE-WAY ALLOW PRORATED MILES: HCPCS | Mod: ORL | Performed by: INTERNAL MEDICINE

## 2022-06-01 PROCEDURE — 80048 BASIC METABOLIC PNL TOTAL CA: CPT | Mod: ORL | Performed by: INTERNAL MEDICINE

## 2022-08-27 ENCOUNTER — HEALTH MAINTENANCE LETTER (OUTPATIENT)
Age: 82
End: 2022-08-27

## 2022-10-20 ENCOUNTER — LAB REQUISITION (OUTPATIENT)
Dept: LAB | Facility: CLINIC | Age: 82
End: 2022-10-20
Payer: COMMERCIAL

## 2022-10-20 DIAGNOSIS — R53.83 OTHER FATIGUE: ICD-10-CM

## 2022-10-20 DIAGNOSIS — R53.1 WEAKNESS: ICD-10-CM

## 2022-10-21 LAB
DEPRECATED CALCIDIOL+CALCIFEROL SERPL-MC: <4 UG/L (ref 20–75)
HGB BLD-MCNC: 10.8 G/DL (ref 11.7–15.7)
TSH SERPL DL<=0.005 MIU/L-ACNC: 3.65 UIU/ML (ref 0.3–4.2)

## 2022-10-21 PROCEDURE — 85018 HEMOGLOBIN: CPT | Mod: ORL | Performed by: INTERNAL MEDICINE

## 2022-10-21 PROCEDURE — P9603 ONE-WAY ALLOW PRORATED MILES: HCPCS | Mod: ORL | Performed by: INTERNAL MEDICINE

## 2022-10-21 PROCEDURE — 36415 COLL VENOUS BLD VENIPUNCTURE: CPT | Mod: ORL | Performed by: INTERNAL MEDICINE

## 2022-10-21 PROCEDURE — 84443 ASSAY THYROID STIM HORMONE: CPT | Mod: ORL | Performed by: INTERNAL MEDICINE

## 2022-10-21 PROCEDURE — 82306 VITAMIN D 25 HYDROXY: CPT | Mod: ORL | Performed by: INTERNAL MEDICINE

## 2022-11-03 ENCOUNTER — LAB REQUISITION (OUTPATIENT)
Dept: LAB | Facility: CLINIC | Age: 82
End: 2022-11-03
Payer: COMMERCIAL

## 2022-11-03 DIAGNOSIS — D64.9 ANEMIA, UNSPECIFIED: ICD-10-CM

## 2022-11-04 LAB
ERYTHROCYTE [DISTWIDTH] IN BLOOD BY AUTOMATED COUNT: 15.1 % (ref 10–15)
FERRITIN SERPL-MCNC: 121 NG/ML (ref 11–328)
HCT VFR BLD AUTO: 32.2 % (ref 35–47)
HGB BLD-MCNC: 9.6 G/DL (ref 11.7–15.7)
IRON SERPL-MCNC: 51 UG/DL (ref 35–180)
MCH RBC QN AUTO: 30.2 PG (ref 26.5–33)
MCHC RBC AUTO-ENTMCNC: 29.8 G/DL (ref 31.5–36.5)
MCV RBC AUTO: 101 FL (ref 78–100)
PLATELET # BLD AUTO: 192 10E3/UL (ref 150–450)
RBC # BLD AUTO: 3.18 10E6/UL (ref 3.8–5.2)
WBC # BLD AUTO: 7.1 10E3/UL (ref 4–11)

## 2022-11-04 PROCEDURE — 85027 COMPLETE CBC AUTOMATED: CPT | Mod: ORL | Performed by: INTERNAL MEDICINE

## 2022-11-04 PROCEDURE — 36415 COLL VENOUS BLD VENIPUNCTURE: CPT | Mod: ORL | Performed by: INTERNAL MEDICINE

## 2022-11-04 PROCEDURE — P9603 ONE-WAY ALLOW PRORATED MILES: HCPCS | Mod: ORL | Performed by: INTERNAL MEDICINE

## 2022-11-04 PROCEDURE — 82728 ASSAY OF FERRITIN: CPT | Mod: ORL | Performed by: INTERNAL MEDICINE

## 2022-11-04 PROCEDURE — 83540 ASSAY OF IRON: CPT | Mod: ORL | Performed by: INTERNAL MEDICINE

## 2022-11-10 ENCOUNTER — LAB REQUISITION (OUTPATIENT)
Dept: LAB | Facility: CLINIC | Age: 82
End: 2022-11-10
Payer: COMMERCIAL

## 2022-11-10 DIAGNOSIS — D64.9 ANEMIA, UNSPECIFIED: ICD-10-CM

## 2022-11-11 LAB — HGB BLD-MCNC: 10.8 G/DL (ref 11.7–15.7)

## 2022-11-11 PROCEDURE — P9603 ONE-WAY ALLOW PRORATED MILES: HCPCS | Mod: ORL | Performed by: INTERNAL MEDICINE

## 2022-11-11 PROCEDURE — 36415 COLL VENOUS BLD VENIPUNCTURE: CPT | Mod: ORL | Performed by: INTERNAL MEDICINE

## 2022-11-11 PROCEDURE — 85018 HEMOGLOBIN: CPT | Mod: ORL | Performed by: INTERNAL MEDICINE

## 2022-12-22 ENCOUNTER — LAB REQUISITION (OUTPATIENT)
Dept: LAB | Facility: CLINIC | Age: 82
End: 2022-12-22
Payer: COMMERCIAL

## 2022-12-22 DIAGNOSIS — E55.9 VITAMIN D DEFICIENCY, UNSPECIFIED: ICD-10-CM

## 2022-12-23 LAB — DEPRECATED CALCIDIOL+CALCIFEROL SERPL-MC: 53 UG/L (ref 20–75)

## 2022-12-23 PROCEDURE — P9603 ONE-WAY ALLOW PRORATED MILES: HCPCS | Mod: ORL | Performed by: FAMILY MEDICINE

## 2022-12-23 PROCEDURE — 82306 VITAMIN D 25 HYDROXY: CPT | Mod: ORL | Performed by: FAMILY MEDICINE

## 2022-12-23 PROCEDURE — 36415 COLL VENOUS BLD VENIPUNCTURE: CPT | Mod: ORL | Performed by: FAMILY MEDICINE

## 2022-12-26 ENCOUNTER — HEALTH MAINTENANCE LETTER (OUTPATIENT)
Age: 82
End: 2022-12-26

## 2023-01-01 ENCOUNTER — LAB REQUISITION (OUTPATIENT)
Dept: LAB | Facility: CLINIC | Age: 83
End: 2023-01-01
Payer: COMMERCIAL

## 2023-01-01 ENCOUNTER — HEALTH MAINTENANCE LETTER (OUTPATIENT)
Age: 83
End: 2023-01-01

## 2023-01-01 DIAGNOSIS — N18.9 CHRONIC KIDNEY DISEASE, UNSPECIFIED: ICD-10-CM

## 2023-01-01 LAB
ANION GAP SERPL CALCULATED.3IONS-SCNC: 8 MMOL/L (ref 7–15)
BUN SERPL-MCNC: 9.9 MG/DL (ref 8–23)
CALCIUM SERPL-MCNC: 8.9 MG/DL (ref 8.8–10.2)
CHLORIDE SERPL-SCNC: 100 MMOL/L (ref 98–107)
CREAT SERPL-MCNC: 0.73 MG/DL (ref 0.51–0.95)
DEPRECATED HCO3 PLAS-SCNC: 30 MMOL/L (ref 22–29)
EGFRCR SERPLBLD CKD-EPI 2021: 81 ML/MIN/1.73M2
GLUCOSE SERPL-MCNC: 122 MG/DL (ref 70–99)
POTASSIUM SERPL-SCNC: 4.1 MMOL/L (ref 3.4–5.3)
SODIUM SERPL-SCNC: 138 MMOL/L (ref 135–145)

## 2023-01-01 PROCEDURE — 80048 BASIC METABOLIC PNL TOTAL CA: CPT | Mod: ORL | Performed by: FAMILY MEDICINE

## 2023-01-01 PROCEDURE — 36415 COLL VENOUS BLD VENIPUNCTURE: CPT | Mod: ORL | Performed by: FAMILY MEDICINE

## 2023-01-01 PROCEDURE — P9603 ONE-WAY ALLOW PRORATED MILES: HCPCS | Mod: ORL | Performed by: FAMILY MEDICINE

## 2023-01-24 ENCOUNTER — LAB REQUISITION (OUTPATIENT)
Dept: LAB | Facility: CLINIC | Age: 83
End: 2023-01-24
Payer: COMMERCIAL

## 2023-01-24 DIAGNOSIS — I15.2 HYPERTENSION SECONDARY TO ENDOCRINE DISORDERS: ICD-10-CM

## 2023-01-25 LAB
ANION GAP SERPL CALCULATED.3IONS-SCNC: 12 MMOL/L (ref 7–15)
BUN SERPL-MCNC: 18.3 MG/DL (ref 8–23)
CALCIUM SERPL-MCNC: 9 MG/DL (ref 8.8–10.2)
CHLORIDE SERPL-SCNC: 101 MMOL/L (ref 98–107)
CREAT SERPL-MCNC: 0.87 MG/DL (ref 0.51–0.95)
DEPRECATED HCO3 PLAS-SCNC: 28 MMOL/L (ref 22–29)
GFR SERPL CREATININE-BSD FRML MDRD: 66 ML/MIN/1.73M2
GLUCOSE SERPL-MCNC: 162 MG/DL (ref 70–99)
POTASSIUM SERPL-SCNC: 4.4 MMOL/L (ref 3.4–5.3)
SODIUM SERPL-SCNC: 141 MMOL/L (ref 136–145)

## 2023-01-25 PROCEDURE — P9603 ONE-WAY ALLOW PRORATED MILES: HCPCS | Mod: ORL | Performed by: FAMILY MEDICINE

## 2023-01-25 PROCEDURE — 80048 BASIC METABOLIC PNL TOTAL CA: CPT | Mod: ORL | Performed by: FAMILY MEDICINE

## 2023-01-25 PROCEDURE — 36415 COLL VENOUS BLD VENIPUNCTURE: CPT | Mod: ORL | Performed by: FAMILY MEDICINE

## 2023-04-16 ENCOUNTER — HEALTH MAINTENANCE LETTER (OUTPATIENT)
Age: 83
End: 2023-04-16

## 2023-05-07 ENCOUNTER — LAB REQUISITION (OUTPATIENT)
Dept: LAB | Facility: CLINIC | Age: 83
End: 2023-05-07
Payer: COMMERCIAL

## 2023-05-07 DIAGNOSIS — D64.9 ANEMIA, UNSPECIFIED: ICD-10-CM

## 2023-05-07 DIAGNOSIS — R53.83 OTHER FATIGUE: ICD-10-CM

## 2023-05-08 LAB
FERRITIN SERPL-MCNC: 140 NG/ML (ref 11–328)
HGB BLD-MCNC: 10.8 G/DL (ref 11.7–15.7)
IRON BINDING CAPACITY (ROCHE): 165 UG/DL (ref 240–430)
IRON SATN MFR SERPL: 41 % (ref 15–46)
IRON SERPL-MCNC: 68 UG/DL (ref 37–145)

## 2023-05-08 PROCEDURE — 83550 IRON BINDING TEST: CPT | Mod: ORL | Performed by: FAMILY MEDICINE

## 2023-05-08 PROCEDURE — 85018 HEMOGLOBIN: CPT | Mod: ORL | Performed by: FAMILY MEDICINE

## 2023-05-08 PROCEDURE — 36415 COLL VENOUS BLD VENIPUNCTURE: CPT | Mod: ORL | Performed by: FAMILY MEDICINE

## 2023-05-08 PROCEDURE — P9603 ONE-WAY ALLOW PRORATED MILES: HCPCS | Mod: ORL | Performed by: FAMILY MEDICINE

## 2023-05-08 PROCEDURE — 82728 ASSAY OF FERRITIN: CPT | Mod: ORL | Performed by: FAMILY MEDICINE

## 2023-07-11 ENCOUNTER — LAB REQUISITION (OUTPATIENT)
Dept: LAB | Facility: CLINIC | Age: 83
End: 2023-07-11
Payer: COMMERCIAL

## 2023-07-11 DIAGNOSIS — R53.83 OTHER FATIGUE: ICD-10-CM

## 2023-07-12 LAB
HGB BLD-MCNC: 10.7 G/DL (ref 11.7–15.7)
IRON BINDING CAPACITY (ROCHE): 161 UG/DL (ref 240–430)
IRON SATN MFR SERPL: 20 % (ref 15–46)
IRON SERPL-MCNC: 32 UG/DL (ref 37–145)

## 2023-07-12 PROCEDURE — 82306 VITAMIN D 25 HYDROXY: CPT | Mod: ORL | Performed by: FAMILY MEDICINE

## 2023-07-12 PROCEDURE — 85018 HEMOGLOBIN: CPT | Mod: ORL | Performed by: FAMILY MEDICINE

## 2023-07-12 PROCEDURE — P9603 ONE-WAY ALLOW PRORATED MILES: HCPCS | Mod: ORL | Performed by: FAMILY MEDICINE

## 2023-07-12 PROCEDURE — 36415 COLL VENOUS BLD VENIPUNCTURE: CPT | Mod: ORL | Performed by: FAMILY MEDICINE

## 2023-07-12 PROCEDURE — 83550 IRON BINDING TEST: CPT | Mod: ORL | Performed by: FAMILY MEDICINE

## 2023-07-13 LAB — DEPRECATED CALCIDIOL+CALCIFEROL SERPL-MC: 35 UG/L (ref 20–75)

## 2024-01-01 ENCOUNTER — HEALTH MAINTENANCE LETTER (OUTPATIENT)
Age: 84
End: 2024-01-01